# Patient Record
Sex: FEMALE | Race: WHITE | NOT HISPANIC OR LATINO | URBAN - METROPOLITAN AREA
[De-identification: names, ages, dates, MRNs, and addresses within clinical notes are randomized per-mention and may not be internally consistent; named-entity substitution may affect disease eponyms.]

---

## 2020-07-25 ENCOUNTER — TELEPHONE ENCOUNTER (OUTPATIENT)
Dept: URBAN - METROPOLITAN AREA CLINIC 13 | Facility: CLINIC | Age: 30
End: 2020-07-25

## 2020-07-25 RX ORDER — COLESEVELAM HYDROCHLORIDE 625 MG/1
TAKE 1 TABLET TWICE DAILY PLEASE SUBSTITUTE CHOLESTYRAMINE 4GM BID IF WELCHOL TABLET IS NOT COVERED. THANK YOU TABLET, FILM COATED ORAL
Qty: 60 | Refills: 3 | OUTPATIENT
Start: 2019-09-27 | End: 2019-11-19

## 2020-07-25 RX ORDER — OMEPRAZOLE 20 MG/1
TAKE 1 CAPSULE DAILY CAPSULE, DELAYED RELEASE ORAL
Refills: 4 | OUTPATIENT
End: 2019-09-18

## 2020-07-25 RX ORDER — ONDANSETRON HYDROCHLORIDE 4 MG/1
TAKE 1 TABLET EVERY 12 HOURS DAILY TABLET, FILM COATED ORAL
Refills: 0 | OUTPATIENT
End: 2019-09-18

## 2020-07-25 RX ORDER — PNV NO.95/FERROUS FUM/FOLIC AC 28MG-0.8MG
TAKE 1 TABLET DAILY AS DIRECTED TABLET ORAL
Refills: 0 | OUTPATIENT
End: 2019-11-19

## 2020-07-25 RX ORDER — FERROUS SULFATE 325(65) MG
TAKE 1 TABLET DAILY AS DIRECTED TABLET ORAL
Refills: 0 | OUTPATIENT
End: 2019-11-19

## 2020-07-25 RX ORDER — SERTRALINE HCL 50 MG
TAKE 1 TABLET DAILY AS DIRECTED TABLET ORAL
Refills: 0 | OUTPATIENT
End: 2019-09-18

## 2020-07-26 ENCOUNTER — TELEPHONE ENCOUNTER (OUTPATIENT)
Dept: URBAN - METROPOLITAN AREA CLINIC 13 | Facility: CLINIC | Age: 30
End: 2020-07-26

## 2020-07-26 RX ORDER — SODIUM SULFATE, POTASSIUM SULFATE, MAGNESIUM SULFATE 17.5; 3.13; 1.6 G/ML; G/ML; G/ML
DILUTE CONTENTS AND USE AS DIRECTED FOR BOWEL PREP SOLUTION, CONCENTRATE ORAL
Qty: 1 | Refills: 0 | Status: ACTIVE | COMMUNITY
Start: 2019-11-19

## 2020-07-26 RX ORDER — LEVOTHYROXINE SODIUM 125 UG/1
TAKE 1 TABLET DAILY TABLET ORAL
Refills: 0 | Status: ACTIVE | COMMUNITY

## 2020-09-18 ENCOUNTER — TELEPHONE ENCOUNTER (OUTPATIENT)
Dept: URBAN - METROPOLITAN AREA CLINIC 113 | Facility: CLINIC | Age: 30
End: 2020-09-18

## 2020-09-18 RX ORDER — SODIUM, POTASSIUM,MAG SULFATES 17.5-3.13G
354ML SOLUTION, RECONSTITUTED, ORAL ORAL
Qty: 354 MILLILITER | Refills: 0 | OUTPATIENT
Start: 2020-09-18 | End: 2020-09-19

## 2020-10-05 ENCOUNTER — CLAIMS CREATED FROM THE CLAIM WINDOW (OUTPATIENT)
Dept: URBAN - METROPOLITAN AREA CLINIC 4 | Facility: CLINIC | Age: 30
End: 2020-10-05
Payer: OTHER GOVERNMENT

## 2020-10-05 ENCOUNTER — OFFICE VISIT (OUTPATIENT)
Dept: URBAN - METROPOLITAN AREA SURGERY CENTER 25 | Facility: SURGERY CENTER | Age: 30
End: 2020-10-05
Payer: OTHER GOVERNMENT

## 2020-10-05 DIAGNOSIS — K63.89 OTHER SPECIFIED DISEASES OF INTESTINE: ICD-10-CM

## 2020-10-05 DIAGNOSIS — Z98.0 INTESTINAL BYPASS AND ANASTOMOSIS STATUS: ICD-10-CM

## 2020-10-05 DIAGNOSIS — R19.7 ACUTE DIARRHEA: ICD-10-CM

## 2020-10-05 PROCEDURE — 45380 COLONOSCOPY AND BIOPSY: CPT | Performed by: INTERNAL MEDICINE

## 2020-10-05 PROCEDURE — 88342 IMHCHEM/IMCYTCHM 1ST ANTB: CPT | Performed by: PATHOLOGY

## 2020-10-05 PROCEDURE — 88305 TISSUE EXAM BY PATHOLOGIST: CPT | Performed by: PATHOLOGY

## 2020-10-05 PROCEDURE — 88313 SPECIAL STAINS GROUP 2: CPT | Performed by: PATHOLOGY

## 2020-10-05 RX ORDER — SODIUM SULFATE, POTASSIUM SULFATE, MAGNESIUM SULFATE 17.5; 3.13; 1.6 G/ML; G/ML; G/ML
DILUTE CONTENTS AND USE AS DIRECTED FOR BOWEL PREP SOLUTION, CONCENTRATE ORAL
Qty: 1 | Refills: 0 | Status: ACTIVE | COMMUNITY
Start: 2019-11-19

## 2020-10-05 RX ORDER — LEVOTHYROXINE SODIUM 125 UG/1
TAKE 1 TABLET DAILY TABLET ORAL
Refills: 0 | Status: ACTIVE | COMMUNITY

## 2020-11-20 ENCOUNTER — OFFICE VISIT (OUTPATIENT)
Dept: URBAN - METROPOLITAN AREA CLINIC 113 | Facility: CLINIC | Age: 30
End: 2020-11-20

## 2021-05-18 ENCOUNTER — WEB ENCOUNTER (OUTPATIENT)
Dept: URBAN - METROPOLITAN AREA CLINIC 113 | Facility: CLINIC | Age: 31
End: 2021-05-18

## 2021-05-18 ENCOUNTER — OFFICE VISIT (OUTPATIENT)
Dept: URBAN - METROPOLITAN AREA CLINIC 113 | Facility: CLINIC | Age: 31
End: 2021-05-18
Payer: OTHER GOVERNMENT

## 2021-05-18 VITALS
SYSTOLIC BLOOD PRESSURE: 125 MMHG | RESPIRATION RATE: 18 BRPM | TEMPERATURE: 98.1 F | DIASTOLIC BLOOD PRESSURE: 87 MMHG | HEIGHT: 67 IN | HEART RATE: 89 BPM | BODY MASS INDEX: 22.6 KG/M2 | WEIGHT: 144 LBS

## 2021-05-18 DIAGNOSIS — R10.13 EPIGASTRIC PAIN: ICD-10-CM

## 2021-05-18 DIAGNOSIS — K21.9 GASTROESOPHAGEAL REFLUX DISEASE, UNSPECIFIED WHETHER ESOPHAGITIS PRESENT: ICD-10-CM

## 2021-05-18 DIAGNOSIS — R13.10 ESOPHAGEAL DYSPHAGIA: ICD-10-CM

## 2021-05-18 PROCEDURE — 99213 OFFICE O/P EST LOW 20 MIN: CPT | Performed by: INTERNAL MEDICINE

## 2021-05-18 RX ORDER — DESOGESTREL AND ETHINYL ESTRADIOL 21-5 (28)
AS DIRECTED KIT ORAL
Status: ACTIVE | COMMUNITY

## 2021-05-18 RX ORDER — LEVOTHYROXINE SODIUM 125 UG/1
TAKE 1 TABLET DAILY TABLET ORAL
Refills: 0 | Status: ACTIVE | COMMUNITY

## 2021-05-18 RX ORDER — SODIUM SULFATE, POTASSIUM SULFATE, MAGNESIUM SULFATE 17.5; 3.13; 1.6 G/ML; G/ML; G/ML
DILUTE CONTENTS AND USE AS DIRECTED FOR BOWEL PREP SOLUTION, CONCENTRATE ORAL
Qty: 1 | Refills: 0 | Status: DISCONTINUED | COMMUNITY
Start: 2019-11-19

## 2021-05-18 RX ORDER — PANTOPRAZOLE SODIUM 40 MG/1
1 TABLET TABLET, DELAYED RELEASE ORAL BID
Qty: 180 TABLET | Refills: 3 | OUTPATIENT
Start: 2021-05-18

## 2021-05-18 RX ORDER — OMEPRAZOLE 40 MG/1
1 CAPSULE 30 MINUTES BEFORE MORNING MEAL CAPSULE, DELAYED RELEASE ORAL ONCE A DAY
Status: ACTIVE | COMMUNITY

## 2021-05-18 NOTE — HPI-TODAY'S VISIT:
30-year-old female presenting for follow-up.  She was last seen in our clinic in November 2019.  Shortly thereafter she did have a colonoscopy performed for rectal bleeding and diarrhea.  Her colonoscopy was unremarkable.  She has not been seen since that visit.  She presents today with a primary complaint of trouble swallowing.  During her 2 pregnancies she had severe reflux and it did improve after delivering. She attempts to avoid certain foods. Over the last week she has had difficulty swallowing. She went to the urgent care after having a feeling like she could not swallow. She was dx with GERD and sent to an ENT MD. She was seen by ENT and started on omeprazole, flonase, and antihistamine. She feels like medications do get stuck.   11/20/2019 Ms. Sky is a 27yo female with a history of hypothyroidism presenting for follow-up regarding diarrhea. She was last seen 9/18/19 for evaluation of persistent diarrhea. Reportedly, recent stool studies were negative, and her symptoms have been refractory to prophylactic antidiarrheal use. At the time of the visit, she was 1.5 weeks postpartum and breastfeeding, so conservative management was favored. She was recommended a trial of daily Benefiber, with Imodium as needed, and Welchol was considered. The Benefiber exacerbated her symptoms. Due to increased stool frequency, and nighttime fecal urgency, Welchol was initiated. She took Welchol for 3 days, with subsequent onset of constipation. She is having a hard bowel movement every 2-3 days, with a looser stool later in the day. She reports associated rectal pain and bleeding, which has improved with Epsom salt baths. She denies associated abdominal pain, nausea, or vomiting. She is no longer breastfeeding. Review of record Labs 6/29/18: H/H 12.8/38.7, MCV 83, Plt 242, WBC 7.14. Normal CMP, lipase, UA. She had a normal gallbladder ultrasound on 6/29/18.

## 2021-05-20 ENCOUNTER — OFFICE VISIT (OUTPATIENT)
Dept: URBAN - METROPOLITAN AREA SURGERY CENTER 25 | Facility: SURGERY CENTER | Age: 31
End: 2021-05-20
Payer: OTHER GOVERNMENT

## 2021-05-20 ENCOUNTER — CLAIMS CREATED FROM THE CLAIM WINDOW (OUTPATIENT)
Dept: URBAN - METROPOLITAN AREA CLINIC 4 | Facility: CLINIC | Age: 31
End: 2021-05-20
Payer: OTHER GOVERNMENT

## 2021-05-20 DIAGNOSIS — K21.9 GASTRO-ESOPHAGEAL REFLUX DISEASE WITHOUT ESOPHAGITIS: ICD-10-CM

## 2021-05-20 DIAGNOSIS — K22.2 SCHATZKI'S RING: ICD-10-CM

## 2021-05-20 DIAGNOSIS — K21.9 ACID REFLUX: ICD-10-CM

## 2021-05-20 DIAGNOSIS — K31.89 GASTRIC PERFORATION WITHOUT ULCER: ICD-10-CM

## 2021-05-20 PROCEDURE — 88312 SPECIAL STAINS GROUP 1: CPT | Performed by: PATHOLOGY

## 2021-05-20 PROCEDURE — G8907 PT DOC NO EVENTS ON DISCHARG: HCPCS | Performed by: INTERNAL MEDICINE

## 2021-05-20 PROCEDURE — 43239 EGD BIOPSY SINGLE/MULTIPLE: CPT | Performed by: INTERNAL MEDICINE

## 2021-05-20 PROCEDURE — 88305 TISSUE EXAM BY PATHOLOGIST: CPT | Performed by: PATHOLOGY

## 2021-05-20 PROCEDURE — 43249 ESOPH EGD DILATION <30 MM: CPT | Performed by: INTERNAL MEDICINE

## 2021-06-01 ENCOUNTER — OFFICE VISIT (OUTPATIENT)
Dept: URBAN - METROPOLITAN AREA CLINIC 107 | Facility: CLINIC | Age: 31
End: 2021-06-01
Payer: OTHER GOVERNMENT

## 2021-06-01 ENCOUNTER — WEB ENCOUNTER (OUTPATIENT)
Dept: URBAN - METROPOLITAN AREA CLINIC 107 | Facility: CLINIC | Age: 31
End: 2021-06-01

## 2021-06-01 VITALS
RESPIRATION RATE: 18 BRPM | DIASTOLIC BLOOD PRESSURE: 75 MMHG | TEMPERATURE: 98.6 F | SYSTOLIC BLOOD PRESSURE: 103 MMHG | HEART RATE: 85 BPM | WEIGHT: 145 LBS | BODY MASS INDEX: 22.76 KG/M2 | HEIGHT: 67 IN

## 2021-06-01 DIAGNOSIS — R13.10 ESOPHAGEAL DYSPHAGIA: ICD-10-CM

## 2021-06-01 DIAGNOSIS — R10.13 EPIGASTRIC PAIN: ICD-10-CM

## 2021-06-01 DIAGNOSIS — K21.9 GASTROESOPHAGEAL REFLUX DISEASE, UNSPECIFIED WHETHER ESOPHAGITIS PRESENT: ICD-10-CM

## 2021-06-01 PROCEDURE — 99213 OFFICE O/P EST LOW 20 MIN: CPT | Performed by: INTERNAL MEDICINE

## 2021-06-01 RX ORDER — SUCRALFATE 1 G/1
1 TABLET TABLET ORAL
Qty: 90 | Refills: 5 | OUTPATIENT
Start: 2021-06-01 | End: 2021-11-27

## 2021-06-01 RX ORDER — LEVOTHYROXINE SODIUM 125 UG/1
TAKE 1 TABLET DAILY TABLET ORAL
Refills: 0 | Status: ACTIVE | COMMUNITY

## 2021-06-01 RX ORDER — DESOGESTREL AND ETHINYL ESTRADIOL 21-5 (28)
AS DIRECTED KIT ORAL
Status: ACTIVE | COMMUNITY

## 2021-06-01 RX ORDER — OMEPRAZOLE 40 MG/1
1 CAPSULE 30 MINUTES BEFORE MORNING MEAL CAPSULE, DELAYED RELEASE ORAL ONCE A DAY
Status: DISCONTINUED | COMMUNITY

## 2021-06-01 RX ORDER — PANTOPRAZOLE SODIUM 40 MG/1
1 TABLET TABLET, DELAYED RELEASE ORAL BID
Qty: 180 TABLET | Refills: 3 | Status: ACTIVE | COMMUNITY
Start: 2021-05-18

## 2021-06-01 NOTE — HPI-TODAY'S VISIT:
This is a 30-year-old female with a history of hypothyroidism, diarrhea, GERD, and difficulty swallowing presenting for follow-up after an EGD. She was last seen 5/18/2021.  She had previously been evaluated in November 2019 for rectal bleeding and diarrhea following which she had an unremarkable colonoscopy.  She was complaining of difficulty swallowing.  She reported a history of severe acid reflux for years.  She has been diagnosed with acid reflux after ENT evaluation and was taking omeprazole, Flonase, and an antihistamine.  she was instructed to stop omeprazole and was prescribed pantoprazole 40 mg twice a day.  She was to stop Flonase and her antihistamine and was scheduled for an EGD.   She reports difficulty swallowing has resolved.  She is taking pantoprazole 40 mg in the morning and 1 at bedtime.  She continues to report discomfort indicated in the left upper quadrant radiating into the left anterior chest.  She describes it further as a cramp or ache.  It typically occurs after eating or is present upon waking in the morning.  She denies heartburn or sensation of regurgitation.  She denies nausea.  She reports a history of irritable bowel syndrome.  She has normal bowel movements with occasional constipation and occasional diarrhea.  Constipation and diarrhea are usually associated with stress.  She takes MiraLAX as needed which works well.  She has tried fiber in the past when she experienced diarrhea and had worsening symptoms.  Her bowel habits are currently stable.

## 2021-06-01 NOTE — HPI-OTHER HISTORIES
EGD 5/20/2021: Widely patent Schatzki's ring status post sequential biopsies with a TTS dilator to 20 mm, normal mucosa in the middle and lower third of the esophagus status post biopsy, gastritis status post biopsy, normal examined duodenum.  Stomach antral and body biopsies demonstrated no significant abnormality.  Biopsies of the middle and lower third of the esophagus showed squamous mucosa with reflux type changes; no columnar mucosa identified and no evidence of eosinophilic esophagitis.

## 2021-07-08 ENCOUNTER — TELEPHONE ENCOUNTER (OUTPATIENT)
Dept: URBAN - METROPOLITAN AREA CLINIC 113 | Facility: CLINIC | Age: 31
End: 2021-07-08

## 2021-08-10 ENCOUNTER — DASHBOARD ENCOUNTERS (OUTPATIENT)
Age: 31
End: 2021-08-10

## 2021-08-10 ENCOUNTER — OFFICE VISIT (OUTPATIENT)
Dept: URBAN - METROPOLITAN AREA CLINIC 107 | Facility: CLINIC | Age: 31
End: 2021-08-10
Payer: OTHER GOVERNMENT

## 2021-08-10 VITALS
DIASTOLIC BLOOD PRESSURE: 86 MMHG | SYSTOLIC BLOOD PRESSURE: 111 MMHG | WEIGHT: 141 LBS | HEIGHT: 67 IN | TEMPERATURE: 97.9 F | BODY MASS INDEX: 22.13 KG/M2 | HEART RATE: 79 BPM

## 2021-08-10 DIAGNOSIS — R13.10 ESOPHAGEAL DYSPHAGIA: ICD-10-CM

## 2021-08-10 DIAGNOSIS — K21.9 GASTROESOPHAGEAL REFLUX DISEASE, UNSPECIFIED WHETHER ESOPHAGITIS PRESENT: ICD-10-CM

## 2021-08-10 DIAGNOSIS — K59.09 OTHER CONSTIPATION: ICD-10-CM

## 2021-08-10 DIAGNOSIS — R10.13 EPIGASTRIC PAIN: ICD-10-CM

## 2021-08-10 PROBLEM — 14760008: Status: ACTIVE | Noted: 2021-08-10

## 2021-08-10 PROBLEM — 102614006: Status: ACTIVE | Noted: 2020-09-22

## 2021-08-10 PROBLEM — 79922009: Status: ACTIVE | Noted: 2021-05-18

## 2021-08-10 PROBLEM — 235595009: Status: ACTIVE | Noted: 2021-05-18

## 2021-08-10 PROBLEM — 40890009: Status: ACTIVE | Noted: 2021-05-18

## 2021-08-10 PROCEDURE — 99213 OFFICE O/P EST LOW 20 MIN: CPT | Performed by: INTERNAL MEDICINE

## 2021-08-10 RX ORDER — PANTOPRAZOLE SODIUM 40 MG/1
1 TABLET TABLET, DELAYED RELEASE ORAL BID
Qty: 180 TABLET | Refills: 3 | Status: ACTIVE | COMMUNITY
Start: 2021-05-18

## 2021-08-10 RX ORDER — SUCRALFATE 1 G/1
1 TABLET TABLET ORAL
Qty: 90 | Refills: 5 | Status: ACTIVE | COMMUNITY
Start: 2021-06-01 | End: 2021-11-27

## 2021-08-10 RX ORDER — ESCITALOPRAM OXALATE 10 MG/1
1 TABLET TABLET, FILM COATED ORAL ONCE A DAY
Status: ACTIVE | COMMUNITY

## 2021-08-10 RX ORDER — LEVOTHYROXINE SODIUM 125 UG/1
TAKE 1 TABLET DAILY TABLET ORAL
Refills: 0 | Status: ACTIVE | COMMUNITY

## 2021-08-10 RX ORDER — DESOGESTREL AND ETHINYL ESTRADIOL 21-5 (28)
AS DIRECTED KIT ORAL
Status: ACTIVE | COMMUNITY

## 2021-08-10 NOTE — HPI-TODAY'S VISIT:
This is a 30-year-old female with a history of hypothyroidism, diarrhea, GERD, and difficulty swallowing status post TTS dilation of a Schatzki's ring in May presenting for follow-up.  She was last seen 6/1/2021.  She was taking pantoprazole twice a day in the morning and at bedtime.  She continued to report left upper quadrant discomfort radiating into her left anterior chest.  She was having normal bowel movements with occasional constipation and occasional diarrhea usually associated with stress.  She was taking MiraLAX as needed.  She had tried fiber in the past which was ineffective.  Functional dyspepsia was a consideration.  She was instructed to change the timing of pantoprazole to once in the morning with breakfast and evening with supper.  She was prescribed Carafate before meals and at bedtime as needed.  A pain modulator was a future consideration. She reports family associated stressors for which she recently saw her primary care physician.  She was feeling excessive tightness in her throat.  She was prescribed Lexapro 10 mg daily which she has been taking for the last 2-1/2 weeks and reports improvement of swallowing.  She further describes a sensation as a "sticky or dry feeling in my throat."  She overall, she feels as though she has benefited from twice daily PPI.  She continues to experience occasional postprandial pain in epigastrium radiating to her back with residual soreness 4 days afterward.  Recently, after eating, she had pain for several days described as sharp.  She was also tasting acid and felt some heartburn.  She has been taking sucralfate again which she thinks has been helpful.  She has a history of constipation.  If she takes MiraLAX, her bowels will move regularly for a few days.  She tried fiber 2 years ago and reports it was ineffective.  She denies any other abdominal symptoms.  She reports an abdominal ultrasound while she was pregnant 2-1/2 years ago that was normal.

## 2021-11-30 ENCOUNTER — OFFICE VISIT (OUTPATIENT)
Dept: URBAN - METROPOLITAN AREA CLINIC 107 | Facility: CLINIC | Age: 31
End: 2021-11-30

## 2022-12-14 ENCOUNTER — ERX REFILL RESPONSE (OUTPATIENT)
Dept: URBAN - METROPOLITAN AREA CLINIC 113 | Facility: CLINIC | Age: 32
End: 2022-12-14

## 2022-12-14 RX ORDER — PANTOPRAZOLE SODIUM 40 MG/1
TAKE ONE TABLET BY MOUTH TWICE A DAY TABLET, DELAYED RELEASE ORAL
Qty: 180 TABLET | Refills: 0 | OUTPATIENT

## 2022-12-14 RX ORDER — PANTOPRAZOLE SODIUM 40 MG/1
1 TABLET TABLET, DELAYED RELEASE ORAL BID
Qty: 180 TABLET | Refills: 3 | OUTPATIENT

## 2024-02-09 ENCOUNTER — OV EP (OUTPATIENT)
Dept: URBAN - METROPOLITAN AREA CLINIC 107 | Facility: CLINIC | Age: 34
End: 2024-02-09

## 2024-02-09 RX ORDER — LEVOTHYROXINE SODIUM 125 UG/1
TAKE 1 TABLET DAILY TABLET ORAL
Refills: 0 | Status: ACTIVE | COMMUNITY

## 2024-02-09 RX ORDER — PANTOPRAZOLE SODIUM 40 MG/1
TAKE ONE TABLET BY MOUTH TWICE A DAY TABLET, DELAYED RELEASE ORAL
Qty: 180 TABLET | Refills: 0 | Status: ACTIVE | COMMUNITY

## 2024-02-09 RX ORDER — ESCITALOPRAM OXALATE 10 MG/1
1 TABLET TABLET, FILM COATED ORAL ONCE A DAY
Status: ACTIVE | COMMUNITY

## 2024-02-09 RX ORDER — DESOGESTREL AND ETHINYL ESTRADIOL 21-5 (28)
AS DIRECTED KIT ORAL
Status: ACTIVE | COMMUNITY

## 2024-02-09 NOTE — HPI-TODAY'S VISIT:
33-year-old female with a history of hypothyroidism, diarrhea, GERD and difficulty swallowing status post TTS dilation of the Schatzki ring and May 2021 presents for long interval follow-up.   She was last seen on 8/10/2021.  Overall her GERD had improved on twice daily PPI.  She continued to have occasional exacerbations of pain and had experienced difficulty swallowing described as tightness in her throat or sticky dry feeling.  There was a suspected component of functional dyspepsia particularly as swallowing problems have improved on Lexapro.  She is recommended continuing twice daily PPI.  She was to continue sulcal fate as needed.  If symptoms persisted we would consider repeating an ultrasound to assess for gallbladder pathology.  She was to call if symptoms warranted.  Her constipation was otherwise well-controlled on MiraLAX as needed.  Chest x-ray 1/5/2024: No acute cardiopulmonary disease.   Labs 1/5/2024: normal CBC, creatinine 1, normal LFTs, normal T4, low TSH 0.34.

## 2024-08-06 ENCOUNTER — OFFICE VISIT (OUTPATIENT)
Dept: URBAN - METROPOLITAN AREA CLINIC 107 | Facility: CLINIC | Age: 34
End: 2024-08-06

## 2024-08-30 ENCOUNTER — OFFICE VISIT (OUTPATIENT)
Dept: URBAN - METROPOLITAN AREA CLINIC 113 | Facility: CLINIC | Age: 34
End: 2024-08-30

## 2024-08-30 NOTE — HPI-TODAY'S VISIT:
This is a 33-year-old female with a history of hypothyroidism, diarrhea, GERD, difficulty swallowing status post TTS dilation of Schatzki's ring in May 2021, epigastric pain, and constipation presenting for long interval follow-up for evaluation of digestive problems. She was last seen in the office 8/10/2021.  Regarding GERD, she reported improvement on pantoprazole 40 mg twice a day.  She continued to have occasional exacerbations of epigastric pain and had experienced difficulty swallowing describing tightness in her throat or a "sticky dry feeling."  It was suspected there was a component of functional dyspepsia  As evidenced by improvement of swallowing on Lexapro.  She was to continue pantoprazole twice a day.  Carafate had been helpful and she was to continue this as needed.  Ultrasound was a future consideration.  She was to call to schedule if symptoms warranted.  She had functional constipation and had responded well to MiraLAX as needed.  She was to retry Benefiber 2 tablespoons daily and continue MiraLAX as needed.

## 2024-09-26 ENCOUNTER — OFFICE VISIT (OUTPATIENT)
Dept: URBAN - METROPOLITAN AREA CLINIC 113 | Facility: CLINIC | Age: 34
End: 2024-09-26

## 2024-12-10 ENCOUNTER — OFFICE VISIT (OUTPATIENT)
Dept: URBAN - METROPOLITAN AREA CLINIC 113 | Facility: CLINIC | Age: 34
End: 2024-12-10